# Patient Record
Sex: MALE | Race: WHITE | HISPANIC OR LATINO | URBAN - NONMETROPOLITAN AREA
[De-identification: names, ages, dates, MRNs, and addresses within clinical notes are randomized per-mention and may not be internally consistent; named-entity substitution may affect disease eponyms.]

---

## 2017-04-06 ENCOUNTER — FOLLOW UP ESTABLISHED (OUTPATIENT)
Dept: URBAN - NONMETROPOLITAN AREA CLINIC 3 | Facility: CLINIC | Age: 57
End: 2017-04-06
Payer: COMMERCIAL

## 2017-04-06 DIAGNOSIS — H52.4 PRESBYOPIA: Primary | ICD-10-CM

## 2017-04-06 PROCEDURE — 92014 COMPRE OPH EXAM EST PT 1/>: CPT | Performed by: OPTOMETRIST

## 2017-04-06 PROCEDURE — 92015 DETERMINE REFRACTIVE STATE: CPT | Performed by: OPTOMETRIST

## 2017-04-06 ASSESSMENT — INTRAOCULAR PRESSURE
OS: 12
OD: 13

## 2017-04-06 ASSESSMENT — VISUAL ACUITY
OD: 20/25
OS: 20/25

## 2018-10-26 ENCOUNTER — FOLLOW UP ESTABLISHED (OUTPATIENT)
Dept: URBAN - NONMETROPOLITAN AREA CLINIC 3 | Facility: CLINIC | Age: 58
End: 2018-10-26
Payer: COMMERCIAL

## 2018-10-26 PROCEDURE — 92015 DETERMINE REFRACTIVE STATE: CPT | Performed by: OPTOMETRIST

## 2018-10-26 PROCEDURE — 92014 COMPRE OPH EXAM EST PT 1/>: CPT | Performed by: OPTOMETRIST

## 2018-10-26 ASSESSMENT — KERATOMETRY
OD: 41.13
OS: 40.75

## 2018-10-26 ASSESSMENT — VISUAL ACUITY
OD: 20/20
OS: 20/20

## 2018-10-26 ASSESSMENT — INTRAOCULAR PRESSURE
OD: 15
OS: 14

## 2020-02-20 ENCOUNTER — FOLLOW UP ESTABLISHED (OUTPATIENT)
Dept: URBAN - NONMETROPOLITAN AREA CLINIC 3 | Facility: CLINIC | Age: 60
End: 2020-02-20
Payer: COMMERCIAL

## 2020-02-20 DIAGNOSIS — H11.041 PERIPHERAL PTERYGIUM, STATIONARY, RIGHT EYE: ICD-10-CM

## 2020-02-20 PROCEDURE — 92015 DETERMINE REFRACTIVE STATE: CPT | Performed by: OPTOMETRIST

## 2020-02-20 PROCEDURE — 92014 COMPRE OPH EXAM EST PT 1/>: CPT | Performed by: OPTOMETRIST

## 2020-02-20 ASSESSMENT — VISUAL ACUITY
OD: 20/20
OS: 20/20

## 2020-02-20 ASSESSMENT — INTRAOCULAR PRESSURE
OS: 14
OD: 14

## 2022-12-06 ENCOUNTER — OFFICE VISIT (OUTPATIENT)
Dept: URBAN - NONMETROPOLITAN AREA CLINIC 3 | Facility: CLINIC | Age: 62
End: 2022-12-06
Payer: COMMERCIAL

## 2022-12-06 DIAGNOSIS — H11.041 PERIPHERAL PTERYGIUM, STATIONARY, RIGHT EYE: ICD-10-CM

## 2022-12-06 DIAGNOSIS — H52.4 PRESBYOPIA: Primary | ICD-10-CM

## 2022-12-06 PROCEDURE — 92014 COMPRE OPH EXAM EST PT 1/>: CPT | Performed by: OPTOMETRIST

## 2022-12-06 ASSESSMENT — VISUAL ACUITY
OD: 20/20
OS: 20/20

## 2022-12-06 ASSESSMENT — INTRAOCULAR PRESSURE
OS: 14
OD: 15

## 2022-12-06 NOTE — IMPRESSION/PLAN
Impression: Peripheral pterygium, stationary, right eye Plan: OD: I discussed diagnosis in detail with patient. Discussed treatment options with patient. No treatment is required/Will continue to observe condition and or symptoms.  AT  QID/OU